# Patient Record
Sex: MALE | Race: BLACK OR AFRICAN AMERICAN | NOT HISPANIC OR LATINO | URBAN - METROPOLITAN AREA
[De-identification: names, ages, dates, MRNs, and addresses within clinical notes are randomized per-mention and may not be internally consistent; named-entity substitution may affect disease eponyms.]

---

## 2019-06-18 ENCOUNTER — OFFICE (OUTPATIENT)
Dept: URBAN - METROPOLITAN AREA CLINIC 11 | Facility: CLINIC | Age: 64
End: 2019-06-18

## 2019-06-18 VITALS
SYSTOLIC BLOOD PRESSURE: 121 MMHG | HEART RATE: 74 BPM | WEIGHT: 197 LBS | HEIGHT: 69 IN | DIASTOLIC BLOOD PRESSURE: 82 MMHG

## 2019-06-18 DIAGNOSIS — K62.5 HEMORRHAGE OF ANUS AND RECTUM: ICD-10-CM

## 2019-06-18 PROCEDURE — 99213 OFFICE O/P EST LOW 20 MIN: CPT | Performed by: INTERNAL MEDICINE

## 2019-06-18 RX ORDER — OMEPRAZOLE 40 MG/1
CAPSULE, DELAYED RELEASE ORAL
Qty: 30 | Refills: 6 | Status: COMPLETED
End: 2020-01-01

## 2019-06-18 NOTE — SERVICEHPINOTES
63-year-old  male known to me from prior colonoscopies.  His last colonoscopy was just 2 and half years ago with sigmoid diverticulosis and 2 adenomas.  He went to the emergency room Saturday night with rectal bleeding that occurred once but seem like a lot of blood.  He seen little bit since 1 time and it is normal again.  Stool is normal in color.  He is on peritoneal dialysis because of end-stage renal disease since January of this year.  Everything seems to be going okay with the peritoneal dialysis.  In the emergency room his hematocrit was 32% and back in April it was 28%.  They let him go home to see us in the office.  He has had no significant abdominal pain and is eating well.  No nausea or vomiting. No black stools.  He sees Dr. Giraldo for Nephrology and sees Dr. Beck for cardiology.  He does complain of acid reflux and wants some medication for this.

## 2019-06-24 ENCOUNTER — OFFICE (OUTPATIENT)
Dept: URBAN - METROPOLITAN AREA CLINIC 11 | Facility: CLINIC | Age: 64
End: 2019-06-24

## 2019-06-26 ENCOUNTER — AMBULATORY SURGICAL CENTER (OUTPATIENT)
Dept: URBAN - METROPOLITAN AREA SURGERY 3 | Facility: SURGERY | Age: 64
End: 2019-06-26
Payer: COMMERCIAL

## 2019-06-26 VITALS
SYSTOLIC BLOOD PRESSURE: 118 MMHG | HEART RATE: 75 BPM | DIASTOLIC BLOOD PRESSURE: 94 MMHG | RESPIRATION RATE: 20 BRPM | SYSTOLIC BLOOD PRESSURE: 106 MMHG | SYSTOLIC BLOOD PRESSURE: 139 MMHG | DIASTOLIC BLOOD PRESSURE: 73 MMHG | RESPIRATION RATE: 20 BRPM | TEMPERATURE: 98.2 F | DIASTOLIC BLOOD PRESSURE: 75 MMHG | TEMPERATURE: 97.6 F | SYSTOLIC BLOOD PRESSURE: 106 MMHG | OXYGEN SATURATION: 94 % | SYSTOLIC BLOOD PRESSURE: 106 MMHG | DIASTOLIC BLOOD PRESSURE: 94 MMHG | DIASTOLIC BLOOD PRESSURE: 91 MMHG | HEART RATE: 71 BPM | DIASTOLIC BLOOD PRESSURE: 99 MMHG | RESPIRATION RATE: 20 BRPM | OXYGEN SATURATION: 95 % | SYSTOLIC BLOOD PRESSURE: 133 MMHG | DIASTOLIC BLOOD PRESSURE: 75 MMHG | HEART RATE: 83 BPM | OXYGEN SATURATION: 94 % | HEIGHT: 69 IN | TEMPERATURE: 98.2 F | RESPIRATION RATE: 16 BRPM | HEIGHT: 69 IN | OXYGEN SATURATION: 95 % | HEART RATE: 75 BPM | HEART RATE: 83 BPM | DIASTOLIC BLOOD PRESSURE: 99 MMHG | DIASTOLIC BLOOD PRESSURE: 73 MMHG | SYSTOLIC BLOOD PRESSURE: 133 MMHG | DIASTOLIC BLOOD PRESSURE: 73 MMHG | SYSTOLIC BLOOD PRESSURE: 139 MMHG | TEMPERATURE: 98.2 F | OXYGEN SATURATION: 94 % | HEART RATE: 72 BPM | RESPIRATION RATE: 15 BRPM | SYSTOLIC BLOOD PRESSURE: 139 MMHG | HEART RATE: 71 BPM | DIASTOLIC BLOOD PRESSURE: 91 MMHG | RESPIRATION RATE: 16 BRPM | DIASTOLIC BLOOD PRESSURE: 91 MMHG | HEART RATE: 83 BPM | SYSTOLIC BLOOD PRESSURE: 145 MMHG | RESPIRATION RATE: 15 BRPM | HEIGHT: 69 IN | TEMPERATURE: 97.6 F | DIASTOLIC BLOOD PRESSURE: 99 MMHG | DIASTOLIC BLOOD PRESSURE: 94 MMHG | SYSTOLIC BLOOD PRESSURE: 145 MMHG | HEART RATE: 75 BPM | WEIGHT: 200 LBS | HEART RATE: 71 BPM | HEART RATE: 72 BPM | HEART RATE: 72 BPM | TEMPERATURE: 97.6 F | OXYGEN SATURATION: 95 % | SYSTOLIC BLOOD PRESSURE: 133 MMHG | SYSTOLIC BLOOD PRESSURE: 145 MMHG | SYSTOLIC BLOOD PRESSURE: 118 MMHG | RESPIRATION RATE: 16 BRPM | RESPIRATION RATE: 15 BRPM | WEIGHT: 200 LBS | WEIGHT: 200 LBS | SYSTOLIC BLOOD PRESSURE: 118 MMHG | DIASTOLIC BLOOD PRESSURE: 75 MMHG

## 2019-06-26 DIAGNOSIS — K57.30 DIVERTICULOSIS OF LARGE INTESTINE WITHOUT PERFORATION OR ABS: ICD-10-CM

## 2019-06-26 DIAGNOSIS — K62.5 HEMORRHAGE OF ANUS AND RECTUM: ICD-10-CM

## 2019-06-26 DIAGNOSIS — K64.1 SECOND DEGREE HEMORRHOIDS: ICD-10-CM

## 2019-06-26 PROBLEM — K92.2 EVALUATION OF UNEXPLAINED GI BLEEDING: Status: ACTIVE | Noted: 2019-06-26

## 2019-06-26 PROCEDURE — 45378 DIAGNOSTIC COLONOSCOPY: CPT | Performed by: INTERNAL MEDICINE

## 2019-06-26 NOTE — SERVICEHPINOTES
63 year old  male returns for colonoscopy.  He was seen in the emergency room for rectal bleeding not long ago and I saw him in the office in follow-up without any further bleeding although he has had another episode since.  His hematocrit was 32 percent in the emergency room.  He has end-stage renal disease on peritoneal dialysis and we did check electrolytes yesterday and they were normal. His last colonoscopy was 3 years ago and revealed 1 small polyp and diverticulosis only.  There is no family history the places him at higher risk.

## 2020-10-07 ENCOUNTER — OFFICE (OUTPATIENT)
Dept: URBAN - METROPOLITAN AREA CLINIC 11 | Facility: CLINIC | Age: 65
End: 2020-10-07

## 2020-10-07 VITALS
OXYGEN SATURATION: 98 % | WEIGHT: 236 LBS | HEART RATE: 78 BPM | HEIGHT: 69 IN | DIASTOLIC BLOOD PRESSURE: 85 MMHG | SYSTOLIC BLOOD PRESSURE: 126 MMHG

## 2020-10-07 DIAGNOSIS — R19.5 OTHER FECAL ABNORMALITIES: ICD-10-CM

## 2020-10-07 DIAGNOSIS — D63.1 ANEMIA IN CHRONIC KIDNEY DISEASE: ICD-10-CM

## 2020-10-07 PROCEDURE — 99213 OFFICE O/P EST LOW 20 MIN: CPT | Performed by: INTERNAL MEDICINE

## 2021-01-01 ENCOUNTER — OFFICE (OUTPATIENT)
Dept: URBAN - METROPOLITAN AREA CLINIC 11 | Facility: CLINIC | Age: 66
End: 2021-01-01
Payer: COMMERCIAL

## 2021-01-01 VITALS
OXYGEN SATURATION: 95 % | HEIGHT: 69 IN | DIASTOLIC BLOOD PRESSURE: 87 MMHG | HEART RATE: 75 BPM | WEIGHT: 243 LBS | SYSTOLIC BLOOD PRESSURE: 139 MMHG

## 2021-01-01 DIAGNOSIS — I12.0 HYPERTENSIVE CHRONIC KIDNEY DISEASE WITH STAGE 5 CHRONIC KID: ICD-10-CM

## 2021-01-01 DIAGNOSIS — D63.1 ANEMIA IN CHRONIC KIDNEY DISEASE: ICD-10-CM

## 2021-01-01 PROCEDURE — 99213 OFFICE O/P EST LOW 20 MIN: CPT | Performed by: INTERNAL MEDICINE
